# Patient Record
Sex: MALE | Race: WHITE | NOT HISPANIC OR LATINO
[De-identification: names, ages, dates, MRNs, and addresses within clinical notes are randomized per-mention and may not be internally consistent; named-entity substitution may affect disease eponyms.]

---

## 2019-07-22 ENCOUNTER — APPOINTMENT (OUTPATIENT)
Dept: INTERNAL MEDICINE | Facility: CLINIC | Age: 54
End: 2019-07-22
Payer: COMMERCIAL

## 2019-07-22 VITALS
SYSTOLIC BLOOD PRESSURE: 119 MMHG | WEIGHT: 182 LBS | DIASTOLIC BLOOD PRESSURE: 85 MMHG | OXYGEN SATURATION: 96 % | HEART RATE: 83 BPM | BODY MASS INDEX: 26.96 KG/M2 | HEIGHT: 69 IN

## 2019-07-22 DIAGNOSIS — R06.02 SHORTNESS OF BREATH: ICD-10-CM

## 2019-07-22 DIAGNOSIS — Z78.9 OTHER SPECIFIED HEALTH STATUS: ICD-10-CM

## 2019-07-22 PROCEDURE — 94010 BREATHING CAPACITY TEST: CPT

## 2019-07-22 PROCEDURE — 94640 AIRWAY INHALATION TREATMENT: CPT | Mod: 59

## 2019-07-22 PROCEDURE — 99214 OFFICE O/P EST MOD 30 MIN: CPT | Mod: 25

## 2019-07-22 RX ORDER — ALBUTEROL SULFATE 90 UG/1
108 (90 BASE) AEROSOL, METERED RESPIRATORY (INHALATION) EVERY 4 HOURS
Qty: 1 | Refills: 3 | Status: ACTIVE | COMMUNITY
Start: 2019-07-22 | End: 1900-01-01

## 2019-07-22 NOTE — PHYSICAL EXAM
[No Acute Distress] : no acute distress [Well Nourished] : well nourished [Well Developed] : well developed [Normal Sclera/Conjunctiva] : normal sclera/conjunctiva [EOMI] : extraocular movements intact [Normal Oropharynx] : the oropharynx was normal [Normal Outer Ear/Nose] : the outer ears and nose were normal in appearance [No JVD] : no jugular venous distention [No Lymphadenopathy] : no lymphadenopathy [Supple] : supple [No Respiratory Distress] : no respiratory distress  [No Accessory Muscle Use] : no accessory muscle use [Clear to Auscultation] : lungs were clear to auscultation bilaterally [Normal Rate] : normal rate  [Regular Rhythm] : with a regular rhythm [Normal S1, S2] : normal S1 and S2 [Normal] : normal rate, regular rhythm, normal S1 and S2 and no murmur heard [No Edema] : there was no peripheral edema

## 2019-07-22 NOTE — ASSESSMENT
[FreeTextEntry1] : possible mild asthma set off by aspiration no  definite pft evidence of this now will try pre-meal ventolin   pt instructed on technique and then later go to ics  if no change will see ent and possibly later    full pfts with or without methacholine challenge

## 2019-07-22 NOTE — HISTORY OF PRESENT ILLNESS
[FreeTextEntry1] : shortness of breath for a few months  mainly after eating he describes sob which is followed by coughing also assoc with a feeling in his chest he says he walks  a mile at a time without a problem. he says  he does not wheeze  he says when he takes a deep breath  he often starts coughing  he does not think he has any heartburn

## 2019-09-03 ENCOUNTER — APPOINTMENT (OUTPATIENT)
Dept: INTERNAL MEDICINE | Facility: CLINIC | Age: 54
End: 2019-09-03

## 2019-09-29 ENCOUNTER — APPOINTMENT (OUTPATIENT)
Dept: INTERNAL MEDICINE | Facility: CLINIC | Age: 54
End: 2019-09-29

## 2021-08-09 ENCOUNTER — EMERGENCY (EMERGENCY)
Facility: HOSPITAL | Age: 56
LOS: 1 days | Discharge: HOME | End: 2021-08-09
Attending: EMERGENCY MEDICINE
Payer: COMMERCIAL

## 2021-08-09 VITALS
DIASTOLIC BLOOD PRESSURE: 82 MMHG | RESPIRATION RATE: 18 BRPM | OXYGEN SATURATION: 100 % | SYSTOLIC BLOOD PRESSURE: 128 MMHG | HEART RATE: 73 BPM

## 2021-08-09 VITALS
RESPIRATION RATE: 16 BRPM | TEMPERATURE: 98 F | HEIGHT: 70 IN | SYSTOLIC BLOOD PRESSURE: 159 MMHG | OXYGEN SATURATION: 98 % | HEART RATE: 74 BPM | WEIGHT: 184.97 LBS | DIASTOLIC BLOOD PRESSURE: 95 MMHG

## 2021-08-09 DIAGNOSIS — S83.249A OTHER TEAR OF MEDIAL MENISCUS, CURRENT INJURY, UNSPECIFIED KNEE, INITIAL ENCOUNTER: Chronic | ICD-10-CM

## 2021-08-09 PROCEDURE — 70450 CT HEAD/BRAIN W/O DYE: CPT | Mod: MA

## 2021-08-09 PROCEDURE — 73030 X-RAY EXAM OF SHOULDER: CPT

## 2021-08-09 PROCEDURE — 71046 X-RAY EXAM CHEST 2 VIEWS: CPT | Mod: 26

## 2021-08-09 PROCEDURE — 73030 X-RAY EXAM OF SHOULDER: CPT | Mod: 26,RT

## 2021-08-09 PROCEDURE — 99284 EMERGENCY DEPT VISIT MOD MDM: CPT

## 2021-08-09 PROCEDURE — 71046 X-RAY EXAM CHEST 2 VIEWS: CPT

## 2021-08-09 PROCEDURE — 70450 CT HEAD/BRAIN W/O DYE: CPT | Mod: 26,MA

## 2021-08-09 PROCEDURE — 99284 EMERGENCY DEPT VISIT MOD MDM: CPT | Mod: 25

## 2021-08-09 RX ORDER — ALPRAZOLAM 0.25 MG
0.5 TABLET ORAL ONCE
Refills: 0 | Status: DISCONTINUED | OUTPATIENT
Start: 2021-08-09 | End: 2021-08-09

## 2021-08-09 RX ORDER — ACETAMINOPHEN 500 MG
975 TABLET ORAL ONCE
Refills: 0 | Status: COMPLETED | OUTPATIENT
Start: 2021-08-09 | End: 2021-08-09

## 2021-08-09 RX ADMIN — Medication 975 MILLIGRAM(S): at 19:52

## 2021-08-09 RX ADMIN — Medication 0.5 MILLIGRAM(S): at 19:53

## 2021-08-09 NOTE — ED PROVIDER NOTE - ATTENDING CONTRIBUTION TO CARE
Attending MD Valentine:   I personally have seen and examined this patient.  Physician assistant note reviewed and agree on plan of care and except where noted.  See below for details.     seen in [place], accompanied by wife    55M with no reported PMH/PSH/Meds/Allergies presents to the ED s/p fall with trauma to head.  Reports that he was in [place], [place] when he slipped and fell on rocks hitting the R temple and R shoulder.  Reports able to ambulate immediately after.  Reports pain with abducting shoulder beyond 90 degrees. Denies preceding dizziness, weakness, sensory changes.  Denies LOC. Denies chest pain, shortness of breath, palpitations. Denies abdominal pain, nausea, vomiting, diarrhea, bloody or black stools. Denies loss of urinary or bowel continence. Denies numbness, weakness or tingling in extremities. A ten (10) point review of systems was negative other than as stated in the HPI or elsewhere in the chart.     Exam:   General: NAD  HENT: head NCAT, airway patent with moist mucous membranes  Eyes: PERRL  Lungs: lungs CTAB with good inspiratory effort, no wheezing, no rhonchi, no rales  Cardiac: +S1S2, no m/r/g  GI: abdomen soft with +BS, NT, ND  : no CVAT  MSK: FROM at neck, no tenderness to midline palpation, no stepoffs along length of spine, no calf tenderness, swelling, erythema or warmth  Neuro: CN 2-12 grossly intact, moving all extremities spontaneously, sensory grossly intact, no gross neuro deficits  Psych: normal mood and affect     A/P: 55M with     TO BE COMPLETED Attending MD Valentine:   I personally have seen and examined this patient.  Physician assistant note reviewed and agree on plan of care and except where noted.  See below for details.     seen in [place], accompanied by wife    55M with no reported PMH/PSH/Meds/Allergies presents to the ED s/p fall with trauma to head.  Reports that he was in [place], [place] when he slipped and fell on rocks hitting the R temple and R shoulder.  Reports able to ambulate immediately after.  Reports pain with abducting shoulder beyond 90 degrees. Denies preceding dizziness, weakness, sensory changes.  Reports mild headache.  Denies LOC. Denies chest pain, shortness of breath, palpitations. Denies abdominal pain, nausea, vomiting, diarrhea, bloody or black stools. Denies loss of urinary or bowel continence. Denies numbness, weakness or tingling in extremities. A ten (10) point review of systems was negative other than as stated in the HPI or elsewhere in the chart.     Exam:   General: NAD  HENT: head NCAT except for R temple hmatoma, airway patent, neg Rodriguez's sign  Eyes: PERRL  Lungs: lungs CTAB with good inspiratory effort, no wheezing, no rhonchi, no rales  Cardiac: +S1S2, no m/r/g  GI: abdomen soft with +BS, NT, ND  : no CVAT  MSK: FROM at neck, no tenderness to midline palpation, no stepoffs along length of spine, no calf tenderness, swelling, erythema or warmth, FROM bilateral extremities, +tenderness to palpation at R shoulder, R hand dominant, +2 radiald  Neuro: CN 2-12 grossly intact, moving all extremities spontaneously, sensory grossly intact, no gross neuro deficits  Psych: normal mood and affect     A/P: 55M with head and shoulder pain s/p fall, will give pain control, will obtain CT head to eval for ICH/other traumatic injury, XR shoulder for bony injury, pain control, reassess

## 2021-08-09 NOTE — ED PROVIDER NOTE - PATIENT PORTAL LINK FT
You can access the FollowMyHealth Patient Portal offered by [facility] by registering at the following website: [email or web address]. By joining [facility]’s FollowMyHealth portal, you will also be able to view your health information using other applications (apps) compatible with our system.

## 2021-08-09 NOTE — ED PROVIDER NOTE - CARE PLAN
Principal Discharge DX:	Closed head injury without concussion, initial encounter  Secondary Diagnosis:	Strain of AC joint, right, initial encounter

## 2021-08-09 NOTE — ED PROVIDER NOTE - PROGRESS NOTE DETAILS
Attending MD Valentine: Patient re-evaluated and no acute issues at  this time.  Radiology tests reviewed with patient and nonactionable.  Patient stable for discharge. Follow up instructions given, importance of follow up emphasized, return to ED parameters reviewed and patient verbalized understanding.  All questions answered, all concerns addressed.

## 2021-08-09 NOTE — ED PROVIDER NOTE - OBJECTIVE STATEMENT
54 y/o M no sig PMHx not on AC presents c/o R sided HA s/p trip and fall on slippery rocks at 11:00 today. No LOC, pt struck R side of his head and R shoulder, reports pain in the R shoulder, difficulty ABducting or moving the shoulder past 90 degrees. Pt ambulatory on scene and in the ED. Pt denies CP, SOB, vomiting, visual change, numbness, paresthesias, weakness, difficulty speaking/walking/swallowing, abd pain, other injury.

## 2021-08-09 NOTE — ED PROVIDER NOTE - CARE PROVIDER_API CALL
Tariq Beck)  Orthopedics  [address] [place] [address]  Phone: [phone]  Fax: [phone]  Follow Up Time: 7-10 Days

## 2021-08-09 NOTE — ED PROVIDER NOTE - NSFOLLOWUPINSTRUCTIONS_ED_ALL_ED_FT
You were seen in the emergency department for head injury. Your discharge diagnosis is closed head injury without loss of consciousness, and strain of the right AC joint.  Please read all attached patient information, read all additional instructions below, and follow-up with all providers as directed.    1) Follow-up with your primary care provider in 1-2 days.     Follow-up with orthopedics in 1 week for further management of your right shoulder pain and possible AC joint injury.    2) Continue to take all medications as prescribed.    3) Rest and stay hydrated. Pain can be managed with Acetaminophen (aka Tylenol) and Ibuprofen (aka Motrin or Advil) over the counter as directed.    4) Return to the ER for any new or worsening symptoms.      Please read all attached patient information.

## 2021-08-09 NOTE — ED PROVIDER NOTE - RAPID ASSESSMENT
55y M pt presents to the ED with c/o HA s/p slip and fall on rocks at 11am today. States he hit the right side of his head and right shoulder. Denies LOC. Denies vision changes, weakness, or dizziness. States he took Advil and 12pm. Pt is speaking in full sentences, responding appropriately to all questions, and is in no distress.     Gwen CHOUDHARY (Scribe) have documented this rapid assessment note under the dictation of Terrie Pope () which has been reviewed and affirmed to be accurate. Patient was seen as a QDOC patient. The patient will be seen and further worked up in the main emergency department and their care will be completed by the main emergency department team along with a thorough physical exam. Receiving team will follow up on labs, analgesia, any clinical imaging, reassess and disposition as clinically indicated, all decisions regarding the progression of care will be made at their discretion. 55y M pt presents to the ED with c/o HA s/p slip and fall on rocks at 11am today. States he hit the right side of his head and right shoulder. Denies LOC. Denies vision changes, weakness, or dizziness. States he took Advil at 12pm. Pt is speaking in full sentences, responding appropriately to all questions, and is in no distress.     IGwen (Scribe) have documented this rapid assessment note under the dictation of Terrie Pope () which has been reviewed and affirmed to be accurate. Patient was seen as a QDOC patient. The patient will be seen and further worked up in the main emergency department and their care will be completed by the main emergency department team along with a thorough physical exam. Receiving team will follow up on labs, analgesia, any clinical imaging, reassess and disposition as clinically indicated, all decisions regarding the progression of care will be made at their discretion.    The scribe's documentation of the rapid assessment has been prepared under my direction and personally reviewed by me in its entirety. I confirm that the note above accurately reflects the rapid assessment performed by me.  A full history and physical exam, and follow up on all labs and imaging will be performed by the primary team.  - Terrie Pope DO

## 2021-08-09 NOTE — ED PROVIDER NOTE - PHYSICAL EXAMINATION
GEN: Pt in NAD, A&O x3. GCS 15.   EYES: No periorbital ecchymosis, sclera white w/o injection PERRLA, EOMI w/o pain.  HENT: +palpable scalp hematoma over R temporal region, +ttp. Nares without deformity, no DC. No auricular tenderness, no ear DC. no Rodriguez's sign. No dental trauma. Neck supple FROM. No midline or paracervical tenderness. Trachea midline.   RESP: No retractions or chest wall deformities, no signs of trauma/bruising. No chest wall tenderness, CTA b/l, no wheezes, rales, or rhonchi.   CARDIAC: RRR clear distinct S1, S2, no murmurs  ABDOMEN: Abdomen without any obvious deformities, no signs of trauma or bruising. Abdomen soft, non-tender. No CVAT b/l.   VASC: 2+ radial and dorsalis pedis pulses b/l.   MSK: No joint erythema or obvious deformities. Spine without obvious deformity. No midline or paraspinal tenderness. +point tenderness over R AC joint. Full passive ROM without pain. Pain with active vertical ABduction or flexion of R shoulder past 90 degrees. Otherwise FROM w/o pain of UE and LE b/l.   NEURO: CN2-12 intact. Normal and equal sensation UE, LE and face b/l. 5/5 strength upper and lower extremity b/l. Pronator drift negative. Normal gait.   SKIN: No rashes noted.

## 2021-08-09 NOTE — ED ADULT TRIAGE NOTE - CHIEF COMPLAINT QUOTE
head injury, right shoulder injury s/p slip and fall on rocks today   denies LOC, not on a.c. took Motrin with pain relief

## 2021-08-09 NOTE — ED ADULT NURSE NOTE - OBJECTIVE STATEMENT
Pt presents for eval after he fell on rocks from a standing height.  He is alert, oriented and conversant. Pt presents for eval after he fell on rocks from a standing height.  He is alert, oriented and conversant.  He reports some difficulty moving his right shoulder, no bony deformity noted.

## 2021-08-09 NOTE — ED ADULT NURSE NOTE - NSIMPLEMENTINTERV_GEN_ALL_ED
Implemented All Fall Risk Interventions:  [place] to call system. Call bell, personal items and telephone within reach. Instruct patient to call for assistance. Room bathroom lighting operational. Non-slip footwear when patient is off stretcher. Physically safe environment: no spills, clutter or unnecessary equipment. Stretcher in lowest position, wheels locked, appropriate side rails in place. Provide visual cue, wrist band, yellow gown, etc. Monitor gait and stability. Monitor for mental status changes and reorient to person, place, and time. Review medications for side effects contributing to fall risk. Reinforce activity limits and safety measures with patient and family.

## 2022-02-28 NOTE — ED ADULT NURSE NOTE - SUICIDE SCREENING DEPRESSION
Negative

## 2025-03-26 ENCOUNTER — APPOINTMENT (OUTPATIENT)
Facility: CLINIC | Age: 60
End: 2025-03-26